# Patient Record
Sex: MALE
[De-identification: names, ages, dates, MRNs, and addresses within clinical notes are randomized per-mention and may not be internally consistent; named-entity substitution may affect disease eponyms.]

---

## 2019-12-12 PROBLEM — Z00.00 ENCOUNTER FOR PREVENTIVE HEALTH EXAMINATION: Status: ACTIVE | Noted: 2019-12-12

## 2019-12-30 DIAGNOSIS — M25.562 PAIN IN LEFT KNEE: ICD-10-CM

## 2020-01-10 ENCOUNTER — APPOINTMENT (OUTPATIENT)
Dept: ORTHOPEDIC SURGERY | Facility: CLINIC | Age: 65
End: 2020-01-10
Payer: COMMERCIAL

## 2020-01-10 VITALS — BODY MASS INDEX: 25.11 KG/M2 | WEIGHT: 160 LBS | HEIGHT: 67 IN

## 2020-01-10 DIAGNOSIS — M22.42 CHONDROMALACIA PATELLAE, LEFT KNEE: ICD-10-CM

## 2020-01-10 DIAGNOSIS — Z78.9 OTHER SPECIFIED HEALTH STATUS: ICD-10-CM

## 2020-01-10 PROCEDURE — 73564 X-RAY EXAM KNEE 4 OR MORE: CPT | Mod: LT

## 2020-01-10 PROCEDURE — 73560 X-RAY EXAM OF KNEE 1 OR 2: CPT | Mod: RT

## 2020-01-10 PROCEDURE — 99203 OFFICE O/P NEW LOW 30 MIN: CPT

## 2020-01-10 RX ORDER — ALLOPURINOL 200 MG/1
TABLET ORAL
Refills: 0 | Status: ACTIVE | COMMUNITY

## 2020-01-10 NOTE — HISTORY OF PRESENT ILLNESS
[de-identified] : 64 year old male presents for initial evaluation of left knee pain for the last 5 months after doing squats. Patient denies any specific injury. He does report a similar instance of knee pain 15 years ago after similar activity which resolved on its own. His current pain is sharp intermittent worsened with stairs and deep flexion. His pain is located in the lateral knee joint. He reports a buckling sensation but denies any other mechanical symptoms. Patient is able to ambulate without a cane, walker, or brace. He can walk unlimited distance and is able to negotiate stairs alternating normally with use of the handrail. Patient denies taking any medications for his knee. He has history of gout on Allopurinol, and last gouty attack was 6 months ago in his right toe. He has no history of prior knee PT, surgery, or injection therapy.

## 2020-01-10 NOTE — DISCUSSION/SUMMARY
[de-identified] : Discussed at length the nature of the patient’s condition. Their LEFT knee symptoms appear patellofemoral most likely chondromalacia or early arthritis aggravated by his very tight musculature. At this point I think that the best option is to begin a course of physiotherapy following the anterior knee pain program and focusing on flexibility. I have recommended Yoga and Pilates, as well as daily stretching. He may take Advil prn and continue his normal activities as tolerated. If he continues to be symptomatic he may return for follow up in 6-8 weeks.

## 2020-01-10 NOTE — ADDENDUM
[FreeTextEntry1] : This note was written by Mireya Larson on 01/10/2020 acting as scribe for Dr. Harrison Gupta M.D.\par \par I, Dr. Harrison Gupta M.D., have read and attest that all the information, medical decision making and discharge instructions within are true and accurate.

## 2020-01-10 NOTE — PHYSICAL EXAM
[de-identified] : General appearance: well nourished and hydrated, pleasant, alert and oriented x 3, cooperative.\par HEENT: Normocephalic, EOM intact, Nasal septum midline, Oral cavity clear, External auditory canal clear.\par Cardiovascular: no apparent abnormalities, no lower leg edema, no varicosities, pedal pulses are palpable.\par Lymphatics Lymph nodes: none palpated, Lymphedema: not present.\par Neurologic: sensation is normal, no muscle weakness in upper or lower extremities, patella tendon reflexes intact .\par Dermatologic no apparent skin lesions, moist, warm, no rash.\par Spine: cervical spine appears normal and moves freely, thoracic spine appears normal and moves freely, lumbosacral spine appears normal and moves freely.\par Gait: nonantalgic.\par \par Left knee\par Inspection: no effusion or erythema.\par Wounds: none.\par Alignment: normal.\par Palpation: no specific tenderness on palpation.\par ROM active (in degrees): 0-145 with 10 degrees of hyperextension, patellofemoral clicking noted\par Ligamentous laxity: all ligaments appear stable,, negative ant. drawer test, negative post. drawer test, stable to varus stress test, stable to valgus stress test. negative Lachman's test, negative pivot shift test\par Meniscal Test: negative McMurrays, negative Jody.\par Patellofemoral Alignment Test: Q angle-, normal.\par Muscle Test: good quad strength.\par Leg examination: calf is soft and non-tender.\par tight hamstring, popliteal angle 90 degrees, positive Archie test, tight IT band \par \par Right knee\par Inspection: no effusion or erythema.\par Wounds: none.\par Alignment: normal.\par Palpation: no specific tenderness on palpation.\par ROM active (in degrees): 0-145 with 10 degrees of hyperextension, patellofemoral clicking noted\par Ligamentous laxity: all ligaments appear stable,, negative ant. drawer test, negative post. drawer test, stable to varus stress test, stable to valgus stress test. negative Lachman's test, negative pivot shift test\par Meniscal Test: negative McMurrays, negative Jody.\par Patellofemoral Alignment Test: Q angle-, normal.\par Muscle Test: good quad strength.\par Leg examination: calf is soft and non-tender.\par tight hamstring, popliteal angle 90 degrees, positive Archie test, tight IT band\par \par Left hip\par Inspection: No swelling or ecchymosis.\par Wounds: none.\par Palpation: non-tender.\par Stability: no instability.\par Strength: 5/5 all motor groups.\par ROM: no pain with FROM.\par Leg length: equal.\par \par Right hip\par Inspection: No swelling or ecchymosis.\par Wounds: none.\par Palpation: non-tender.\par Stability: no instability.\par Strength: 5/5 all motor groups.\par ROM: no pain with FROM.\par Leg length: equal.\par \par Left ankle\par Inspection: no erythema noted, no swelling noted.\par Palpation: no pain on palpation .\par ROM: FROM without crepitus.\par Muscle strength: 5/5.\par Stability: no instability noted.\par \par Right ankle\par Inspection: no erythema noted, no swelling noted.\par ROM: FROM without crepitus.\par Palpation: no pain on palpation .\par Muscle strength: 5/5.\par Stability: no instability noted.\par \par Left foot\par Inspection: color, texture and turgor are normal.\par ROM: full range of motion of all joints without pain or crepitus.\par Palpation: no tenderness.\par Stability: no instability noted.\par \par Right foot\par Inspection: color, texture and turgor are normal.\par ROM: full range of motion of all joints without pain or crepitus.\par Palpation: no tenderness.\par Stability: no instability noted. [de-identified] : Right knee xray merchant view taken at the office today shows the patella in a central position with joint space narrowing consistent with patellofemoral arthritis \par \par Left knee xrays, AP, lateral, merchant, and 45 degree PA standing view taken at the office today demonstrates normal alignment, femoral tibial joint space maintained, patella sits at an appropriate height in a central position with mild joint space narrowing consistent with mild patellofemoral arthritis

## 2024-08-22 ENCOUNTER — APPOINTMENT (OUTPATIENT)
Dept: PEDIATRIC ALLERGY IMMUNOLOGY | Facility: CLINIC | Age: 69
End: 2024-08-22
Payer: MEDICARE

## 2024-08-22 VITALS
WEIGHT: 160 LBS | SYSTOLIC BLOOD PRESSURE: 153 MMHG | HEIGHT: 67 IN | DIASTOLIC BLOOD PRESSURE: 102 MMHG | BODY MASS INDEX: 25.11 KG/M2 | HEART RATE: 62 BPM | OXYGEN SATURATION: 97 %

## 2024-08-22 DIAGNOSIS — Z78.9 OTHER SPECIFIED HEALTH STATUS: ICD-10-CM

## 2024-08-22 DIAGNOSIS — M10.9 GOUT, UNSPECIFIED: ICD-10-CM

## 2024-08-22 DIAGNOSIS — R09.82 POSTNASAL DRIP: ICD-10-CM

## 2024-08-22 DIAGNOSIS — R05.8 OTHER SPECIFIED COUGH: ICD-10-CM

## 2024-08-22 DIAGNOSIS — R09.81 OTHER SPECIFIED COUGH: ICD-10-CM

## 2024-08-22 PROCEDURE — 99204 OFFICE O/P NEW MOD 45 MIN: CPT | Mod: 25

## 2024-08-22 PROCEDURE — 95004 PERQ TESTS W/ALRGNC XTRCS: CPT

## 2024-08-22 PROCEDURE — 95012 NITRIC OXIDE EXP GAS DETER: CPT

## 2024-08-22 PROCEDURE — 94010 BREATHING CAPACITY TEST: CPT

## 2024-08-22 NOTE — HISTORY OF PRESENT ILLNESS
[de-identified] : Only so Chang Ventura is a 69-year-old gentleman who comes to the office today with a chief complaint that he had a cough since May.  It did start with a respiratory infection and initially when he had a cough he had some wheezing sounds with the cough.  He was seen at urgent care for this and they did not find any source for the cough.  They did treat him with some cough medications and an inhaler which did not result in any improvement in his symptoms.  He states he has had some nasal mucus and some congestion.  The cough can be worse when he gets up after lying down.  But it does not necessarily occur every morning that way.  He has a history of getting sudden episodes of cough Frank feeling of difficulty breathing in middle the night that he would get up suddenly.  Those episodes have decreased because he realizes nothing bad is happening to him.  He was not he does have a history of smoking for approximately 10 years and he stopped that approximately 40 years ago.. The cough never awake wakes him in middle the night.

## 2024-08-22 NOTE — ASSESSMENT
[FreeTextEntry1] : Mr. Alejandro Ventura has normal lung function and normal oxygen saturation of 97. His cough is as a result of the postnasal drip from a possible sinusitis. He does have positive environmental allergy skin test which may be contributing to the cough. He has no evidence of asthma. I am planks and placing him in on clarithromycin 500 mg twice daily for 2 weeks.  I will see him in 2 weeks for a follow-up.

## 2024-08-22 NOTE — IMPRESSION
[Pulmonary Function Test] : Pulmonary Function Test [Normal PFT] : pulmonary function test normal  [Fractional of Exhaled Nitric Oxide ___] : Fractional of Exhaled Nitric Oxide [unfilled] [High] : high [_____] : grasses ([unfilled]) [Allergy Testing Mixed Feathers] : feathers [Allergy Testing Dog] : dog [Allergy Testing Cat] : cat [Allergy Testing Weeds] : weeds [FreeTextEntry1] : FEV1 is 107% of pred. [FreeTextEntry2] : Environmental allergy skin test of 4+ to Alternaria, 2+ to tree, grass, dust mite, weeds, planting, lambs quarter, negative to all others

## 2024-08-22 NOTE — SOCIAL HISTORY
[Dog] : dog [Humidifier] : does not use a humidifier [Dehumidifier] : does not use a dehumidifier [Cockroaches] : Patient states that there are no cockroaches in the home [Dust Mite Covers] : does not have dust mite covers [Feather Pillows] : does not have feather pillows [Feather Comforter] : does not have a feather comforter [Bedroom] : not in the bedroom [Living Area] : not in the living area

## 2024-08-23 PROBLEM — R09.82 PND (POST-NASAL DRIP): Status: ACTIVE | Noted: 2024-08-23

## 2024-08-23 RX ORDER — CLARITHROMYCIN 500 MG/1
500 TABLET, FILM COATED ORAL
Qty: 28 | Refills: 0 | Status: ACTIVE | COMMUNITY
Start: 2024-08-23 | End: 1900-01-01

## 2024-08-23 RX ORDER — CLARITHROMYCIN 500 MG/1
500 TABLET, FILM COATED ORAL TWICE DAILY
Qty: 28 | Refills: 0 | Status: ACTIVE | COMMUNITY
Start: 2024-08-22 | End: 1900-01-01

## 2024-08-26 ENCOUNTER — TRANSCRIPTION ENCOUNTER (OUTPATIENT)
Age: 69
End: 2024-08-26

## 2024-09-12 ENCOUNTER — APPOINTMENT (OUTPATIENT)
Dept: PEDIATRIC ALLERGY IMMUNOLOGY | Facility: CLINIC | Age: 69
End: 2024-09-12
Payer: MEDICARE

## 2024-09-12 DIAGNOSIS — R05.8 OTHER SPECIFIED COUGH: ICD-10-CM

## 2024-09-12 DIAGNOSIS — R09.82 POSTNASAL DRIP: ICD-10-CM

## 2024-09-12 DIAGNOSIS — R09.81 OTHER SPECIFIED COUGH: ICD-10-CM

## 2024-09-12 PROCEDURE — 99213 OFFICE O/P EST LOW 20 MIN: CPT | Mod: 25

## 2024-09-12 PROCEDURE — 94010 BREATHING CAPACITY TEST: CPT

## 2024-09-12 RX ORDER — PREDNISONE 20 MG/1
20 TABLET ORAL
Qty: 14 | Refills: 0 | Status: ACTIVE | COMMUNITY
Start: 2024-09-12 | End: 1900-01-01

## 2024-09-14 NOTE — IMPRESSION
[Pulmonary Function Test] : Pulmonary Function Test [Normal PFT] : pulmonary function test normal  [FreeTextEntry1] : FEV1 is 98% of predicted.

## 2024-09-14 NOTE — ASSESSMENT
[FreeTextEntry1] : Alejandro's cough continues despite his clarithromycin.  He does have an occasional wheeze. His pulmonary function today is normal with an FEV1 of 98% of predicted. Is continuously I am placing him on a 1 week course of prednisone 40 mg a day for 7 days and I will follow-up in a week.  If there is no improvement I would then refer him to an otolaryngologist.  Clear clearing his throat as we speak.

## 2024-09-14 NOTE — HISTORY OF PRESENT ILLNESS
[de-identified] : Alejandro Ventura comes for follow-up of his cough and wheeze after being treated with clarithromycin for the last 2 weeks.  He states his symptoms may be slightly improved but not to any significant degree.  This evaluation he was clear continues to clear his throat often.

## 2024-09-26 ENCOUNTER — APPOINTMENT (OUTPATIENT)
Dept: PEDIATRIC ALLERGY IMMUNOLOGY | Facility: CLINIC | Age: 69
End: 2024-09-26

## 2024-09-26 PROCEDURE — 94010 BREATHING CAPACITY TEST: CPT

## 2024-09-26 PROCEDURE — 99213 OFFICE O/P EST LOW 20 MIN: CPT | Mod: 25

## 2025-09-11 ENCOUNTER — APPOINTMENT (OUTPATIENT)
Age: 70
End: 2025-09-11